# Patient Record
Sex: MALE | Race: WHITE | Employment: FULL TIME | URBAN - METROPOLITAN AREA
[De-identification: names, ages, dates, MRNs, and addresses within clinical notes are randomized per-mention and may not be internally consistent; named-entity substitution may affect disease eponyms.]

---

## 2017-12-26 ENCOUNTER — ANESTHESIA (OUTPATIENT)
Dept: SURGERY | Facility: CLINIC | Age: 62
End: 2017-12-26
Payer: COMMERCIAL

## 2017-12-26 ENCOUNTER — HOSPITAL ENCOUNTER (INPATIENT)
Facility: CLINIC | Age: 62
LOS: 1 days | Discharge: HOME | End: 2017-12-27
Attending: UROLOGY | Admitting: UROLOGY
Payer: COMMERCIAL

## 2017-12-26 ENCOUNTER — ANESTHESIA EVENT (OUTPATIENT)
Dept: SURGERY | Facility: CLINIC | Age: 62
End: 2017-12-26
Payer: COMMERCIAL

## 2017-12-26 DIAGNOSIS — C61 CA OF PROSTATE (H): Primary | ICD-10-CM

## 2017-12-26 LAB — POTASSIUM SERPL-SCNC: 3.7 MMOL/L (ref 3.4–5.3)

## 2017-12-26 PROCEDURE — 25000128 H RX IP 250 OP 636: Performed by: ANESTHESIOLOGY

## 2017-12-26 PROCEDURE — 25000125 ZZHC RX 250: Performed by: UROLOGY

## 2017-12-26 PROCEDURE — 37000008 ZZH ANESTHESIA TECHNICAL FEE, 1ST 30 MIN: Performed by: UROLOGY

## 2017-12-26 PROCEDURE — 25000128 H RX IP 250 OP 636: Performed by: UROLOGY

## 2017-12-26 PROCEDURE — 0VT34ZZ RESECTION OF BILATERAL SEMINAL VESICLES, PERCUTANEOUS ENDOSCOPIC APPROACH: ICD-10-PCS | Performed by: UROLOGY

## 2017-12-26 PROCEDURE — 88305 TISSUE EXAM BY PATHOLOGIST: CPT | Performed by: UROLOGY

## 2017-12-26 PROCEDURE — 25000128 H RX IP 250 OP 636: Performed by: NURSE ANESTHETIST, CERTIFIED REGISTERED

## 2017-12-26 PROCEDURE — 0VT04ZZ RESECTION OF PROSTATE, PERCUTANEOUS ENDOSCOPIC APPROACH: ICD-10-PCS | Performed by: UROLOGY

## 2017-12-26 PROCEDURE — 27210995 ZZH RX 272: Performed by: UROLOGY

## 2017-12-26 PROCEDURE — 88309 TISSUE EXAM BY PATHOLOGIST: CPT | Performed by: UROLOGY

## 2017-12-26 PROCEDURE — 8E0W4CZ ROBOTIC ASSISTED PROCEDURE OF TRUNK REGION, PERCUTANEOUS ENDOSCOPIC APPROACH: ICD-10-PCS | Performed by: UROLOGY

## 2017-12-26 PROCEDURE — 36000085 ZZH SURGERY LEVEL 8 1ST 30 MIN: Performed by: UROLOGY

## 2017-12-26 PROCEDURE — 0VBQ4ZZ EXCISION OF BILATERAL VAS DEFERENS, PERCUTANEOUS ENDOSCOPIC APPROACH: ICD-10-PCS | Performed by: UROLOGY

## 2017-12-26 PROCEDURE — 40000169 ZZH STATISTIC PRE-PROCEDURE ASSESSMENT I: Performed by: UROLOGY

## 2017-12-26 PROCEDURE — 36415 COLL VENOUS BLD VENIPUNCTURE: CPT | Performed by: ANESTHESIOLOGY

## 2017-12-26 PROCEDURE — 88309 TISSUE EXAM BY PATHOLOGIST: CPT | Mod: 26 | Performed by: UROLOGY

## 2017-12-26 PROCEDURE — 25800025 ZZH RX 258: Performed by: UROLOGY

## 2017-12-26 PROCEDURE — 71000012 ZZH RECOVERY PHASE 1 LEVEL 1 FIRST HR: Performed by: UROLOGY

## 2017-12-26 PROCEDURE — 25000132 ZZH RX MED GY IP 250 OP 250 PS 637: Performed by: UROLOGY

## 2017-12-26 PROCEDURE — 36000087 ZZH SURGERY LEVEL 8 EA 15 ADDTL MIN: Performed by: UROLOGY

## 2017-12-26 PROCEDURE — 84132 ASSAY OF SERUM POTASSIUM: CPT | Performed by: ANESTHESIOLOGY

## 2017-12-26 PROCEDURE — 07BC4ZZ EXCISION OF PELVIS LYMPHATIC, PERCUTANEOUS ENDOSCOPIC APPROACH: ICD-10-PCS | Performed by: UROLOGY

## 2017-12-26 PROCEDURE — 25000125 ZZHC RX 250: Performed by: NURSE ANESTHETIST, CERTIFIED REGISTERED

## 2017-12-26 PROCEDURE — 37000009 ZZH ANESTHESIA TECHNICAL FEE, EACH ADDTL 15 MIN: Performed by: UROLOGY

## 2017-12-26 PROCEDURE — 88305 TISSUE EXAM BY PATHOLOGIST: CPT | Mod: 26 | Performed by: UROLOGY

## 2017-12-26 PROCEDURE — 12000000 ZZH R&B MED SURG/OB

## 2017-12-26 PROCEDURE — 25000125 ZZHC RX 250: Performed by: ANESTHESIOLOGY

## 2017-12-26 PROCEDURE — 25000566 ZZH SEVOFLURANE, EA 15 MIN: Performed by: UROLOGY

## 2017-12-26 PROCEDURE — 27210794 ZZH OR GENERAL SUPPLY STERILE: Performed by: UROLOGY

## 2017-12-26 RX ORDER — NEOMYCIN/BACITRACIN/POLYMYXINB 3.5-400-5K
OINTMENT (GRAM) TOPICAL 4 TIMES DAILY PRN
Status: DISCONTINUED | OUTPATIENT
Start: 2017-12-26 | End: 2017-12-27 | Stop reason: HOSPADM

## 2017-12-26 RX ORDER — CIPROFLOXACIN 2 MG/ML
400 INJECTION, SOLUTION INTRAVENOUS EVERY 12 HOURS
Status: DISCONTINUED | OUTPATIENT
Start: 2017-12-26 | End: 2017-12-27

## 2017-12-26 RX ORDER — NEOSTIGMINE METHYLSULFATE 1 MG/ML
VIAL (ML) INJECTION PRN
Status: DISCONTINUED | OUTPATIENT
Start: 2017-12-26 | End: 2017-12-26

## 2017-12-26 RX ORDER — CLINDAMYCIN PHOSPHATE 900 MG/50ML
900 INJECTION, SOLUTION INTRAVENOUS
Status: COMPLETED | OUTPATIENT
Start: 2017-12-26 | End: 2017-12-26

## 2017-12-26 RX ORDER — ONDANSETRON 4 MG/1
4 TABLET, ORALLY DISINTEGRATING ORAL EVERY 30 MIN PRN
Status: DISCONTINUED | OUTPATIENT
Start: 2017-12-26 | End: 2017-12-26 | Stop reason: HOSPADM

## 2017-12-26 RX ORDER — FENTANYL CITRATE 50 UG/ML
INJECTION, SOLUTION INTRAMUSCULAR; INTRAVENOUS PRN
Status: DISCONTINUED | OUTPATIENT
Start: 2017-12-26 | End: 2017-12-26

## 2017-12-26 RX ORDER — ONDANSETRON 2 MG/ML
INJECTION INTRAMUSCULAR; INTRAVENOUS PRN
Status: DISCONTINUED | OUTPATIENT
Start: 2017-12-26 | End: 2017-12-26

## 2017-12-26 RX ORDER — VECURONIUM BROMIDE 1 MG/ML
INJECTION, POWDER, LYOPHILIZED, FOR SOLUTION INTRAVENOUS PRN
Status: DISCONTINUED | OUTPATIENT
Start: 2017-12-26 | End: 2017-12-26

## 2017-12-26 RX ORDER — METHYLPREDNISOLONE 4 MG
8 TABLET, DOSE PACK ORAL DAILY PRN
COMMUNITY

## 2017-12-26 RX ORDER — SODIUM CHLORIDE, SODIUM LACTATE, POTASSIUM CHLORIDE, CALCIUM CHLORIDE 600; 310; 30; 20 MG/100ML; MG/100ML; MG/100ML; MG/100ML
INJECTION, SOLUTION INTRAVENOUS CONTINUOUS
Status: DISCONTINUED | OUTPATIENT
Start: 2017-12-26 | End: 2017-12-26 | Stop reason: HOSPADM

## 2017-12-26 RX ORDER — ONDANSETRON 2 MG/ML
4 INJECTION INTRAMUSCULAR; INTRAVENOUS EVERY 30 MIN PRN
Status: DISCONTINUED | OUTPATIENT
Start: 2017-12-26 | End: 2017-12-26 | Stop reason: HOSPADM

## 2017-12-26 RX ORDER — TRIAMTERENE/HYDROCHLOROTHIAZID 37.5-25 MG
1 TABLET ORAL EVERY MORNING
Status: DISCONTINUED | OUTPATIENT
Start: 2017-12-27 | End: 2017-12-27 | Stop reason: HOSPADM

## 2017-12-26 RX ORDER — BUPIVACAINE HYDROCHLORIDE 2.5 MG/ML
INJECTION, SOLUTION INFILTRATION; PERINEURAL PRN
Status: DISCONTINUED | OUTPATIENT
Start: 2017-12-26 | End: 2017-12-26 | Stop reason: HOSPADM

## 2017-12-26 RX ORDER — AMLODIPINE BESYLATE 10 MG/1
10 TABLET ORAL DAILY
Status: DISCONTINUED | OUTPATIENT
Start: 2017-12-26 | End: 2017-12-27 | Stop reason: HOSPADM

## 2017-12-26 RX ORDER — OXYCODONE AND ACETAMINOPHEN 5; 325 MG/1; MG/1
1-2 TABLET ORAL EVERY 4 HOURS PRN
Status: DISCONTINUED | OUTPATIENT
Start: 2017-12-26 | End: 2017-12-27 | Stop reason: HOSPADM

## 2017-12-26 RX ORDER — FENTANYL CITRATE 0.05 MG/ML
25-50 INJECTION, SOLUTION INTRAMUSCULAR; INTRAVENOUS
Status: DISCONTINUED | OUTPATIENT
Start: 2017-12-26 | End: 2017-12-26 | Stop reason: HOSPADM

## 2017-12-26 RX ORDER — PROPOFOL 10 MG/ML
INJECTION, EMULSION INTRAVENOUS PRN
Status: DISCONTINUED | OUTPATIENT
Start: 2017-12-26 | End: 2017-12-26

## 2017-12-26 RX ORDER — MAGNESIUM HYDROXIDE 1200 MG/15ML
LIQUID ORAL PRN
Status: DISCONTINUED | OUTPATIENT
Start: 2017-12-26 | End: 2017-12-26 | Stop reason: HOSPADM

## 2017-12-26 RX ORDER — HYDROMORPHONE HYDROCHLORIDE 1 MG/ML
.3-.5 INJECTION, SOLUTION INTRAMUSCULAR; INTRAVENOUS; SUBCUTANEOUS
Status: DISCONTINUED | OUTPATIENT
Start: 2017-12-26 | End: 2017-12-27 | Stop reason: HOSPADM

## 2017-12-26 RX ORDER — TRIAMTERENE AND HYDROCHLOROTHIAZIDE 37.5; 25 MG/1; MG/1
1 CAPSULE ORAL EVERY MORNING
Status: DISCONTINUED | OUTPATIENT
Start: 2017-12-27 | End: 2017-12-26

## 2017-12-26 RX ORDER — METOPROLOL SUCCINATE 50 MG/1
200 TABLET, EXTENDED RELEASE ORAL DAILY
Status: DISCONTINUED | OUTPATIENT
Start: 2017-12-26 | End: 2017-12-27 | Stop reason: HOSPADM

## 2017-12-26 RX ORDER — LIDOCAINE 40 MG/G
CREAM TOPICAL
Status: DISCONTINUED | OUTPATIENT
Start: 2017-12-26 | End: 2017-12-27 | Stop reason: HOSPADM

## 2017-12-26 RX ORDER — HYDROMORPHONE HYDROCHLORIDE 1 MG/ML
.3-.5 INJECTION, SOLUTION INTRAMUSCULAR; INTRAVENOUS; SUBCUTANEOUS EVERY 5 MIN PRN
Status: DISCONTINUED | OUTPATIENT
Start: 2017-12-26 | End: 2017-12-26 | Stop reason: HOSPADM

## 2017-12-26 RX ORDER — ACETAMINOPHEN 10 MG/ML
1000 INJECTION, SOLUTION INTRAVENOUS
Status: COMPLETED | OUTPATIENT
Start: 2017-12-26 | End: 2017-12-26

## 2017-12-26 RX ORDER — DEXAMETHASONE SODIUM PHOSPHATE 4 MG/ML
INJECTION, SOLUTION INTRA-ARTICULAR; INTRALESIONAL; INTRAMUSCULAR; INTRAVENOUS; SOFT TISSUE PRN
Status: DISCONTINUED | OUTPATIENT
Start: 2017-12-26 | End: 2017-12-26

## 2017-12-26 RX ORDER — ALLOPURINOL 300 MG/1
300 TABLET ORAL DAILY
Status: DISCONTINUED | OUTPATIENT
Start: 2017-12-26 | End: 2017-12-27 | Stop reason: HOSPADM

## 2017-12-26 RX ORDER — NALOXONE HYDROCHLORIDE 0.4 MG/ML
.1-.4 INJECTION, SOLUTION INTRAMUSCULAR; INTRAVENOUS; SUBCUTANEOUS
Status: DISCONTINUED | OUTPATIENT
Start: 2017-12-26 | End: 2017-12-27 | Stop reason: HOSPADM

## 2017-12-26 RX ORDER — LIDOCAINE HYDROCHLORIDE 20 MG/ML
INJECTION, SOLUTION INFILTRATION; PERINEURAL PRN
Status: DISCONTINUED | OUTPATIENT
Start: 2017-12-26 | End: 2017-12-26

## 2017-12-26 RX ORDER — LIDOCAINE 50 MG/G
OINTMENT TOPICAL 4 TIMES DAILY PRN
Status: DISCONTINUED | OUTPATIENT
Start: 2017-12-26 | End: 2017-12-27 | Stop reason: HOSPADM

## 2017-12-26 RX ORDER — NALOXONE HYDROCHLORIDE 0.4 MG/ML
.1-.4 INJECTION, SOLUTION INTRAMUSCULAR; INTRAVENOUS; SUBCUTANEOUS
Status: DISCONTINUED | OUTPATIENT
Start: 2017-12-26 | End: 2017-12-26

## 2017-12-26 RX ORDER — EPHEDRINE SULFATE 50 MG/ML
INJECTION, SOLUTION INTRAMUSCULAR; INTRAVENOUS; SUBCUTANEOUS PRN
Status: DISCONTINUED | OUTPATIENT
Start: 2017-12-26 | End: 2017-12-26

## 2017-12-26 RX ORDER — GLYCOPYRROLATE 0.2 MG/ML
INJECTION, SOLUTION INTRAMUSCULAR; INTRAVENOUS PRN
Status: DISCONTINUED | OUTPATIENT
Start: 2017-12-26 | End: 2017-12-26

## 2017-12-26 RX ADMIN — AMLODIPINE BESYLATE 10 MG: 10 TABLET ORAL at 20:07

## 2017-12-26 RX ADMIN — Medication 5 MG: at 12:04

## 2017-12-26 RX ADMIN — ACETAMINOPHEN 1000 MG: 10 INJECTION, SOLUTION INTRAVENOUS at 14:22

## 2017-12-26 RX ADMIN — VECURONIUM BROMIDE 1 MG: 1 INJECTION, POWDER, LYOPHILIZED, FOR SOLUTION INTRAVENOUS at 13:52

## 2017-12-26 RX ADMIN — Medication 0.3 MG: at 15:26

## 2017-12-26 RX ADMIN — VECURONIUM BROMIDE 1 MG: 1 INJECTION, POWDER, LYOPHILIZED, FOR SOLUTION INTRAVENOUS at 13:35

## 2017-12-26 RX ADMIN — ALLOPURINOL 300 MG: 300 TABLET ORAL at 17:05

## 2017-12-26 RX ADMIN — LIDOCAINE HYDROCHLORIDE 100 MG: 20 INJECTION, SOLUTION INFILTRATION; PERINEURAL at 11:56

## 2017-12-26 RX ADMIN — FENTANYL CITRATE 100 MCG: 50 INJECTION, SOLUTION INTRAMUSCULAR; INTRAVENOUS at 11:56

## 2017-12-26 RX ADMIN — Medication 0.3 MG: at 15:15

## 2017-12-26 RX ADMIN — ROCURONIUM BROMIDE 50 MG: 10 INJECTION INTRAVENOUS at 12:06

## 2017-12-26 RX ADMIN — VECURONIUM BROMIDE 2 MG: 1 INJECTION, POWDER, LYOPHILIZED, FOR SOLUTION INTRAVENOUS at 13:02

## 2017-12-26 RX ADMIN — Medication 0.3 MG: at 15:33

## 2017-12-26 RX ADMIN — PROPOFOL 200 MG: 10 INJECTION, EMULSION INTRAVENOUS at 11:56

## 2017-12-26 RX ADMIN — LIDOCAINE HYDROCHLORIDE 0.3 ML: 10 INJECTION, SOLUTION EPIDURAL; INFILTRATION; INTRACAUDAL; PERINEURAL at 10:25

## 2017-12-26 RX ADMIN — Medication 5 MG: at 12:11

## 2017-12-26 RX ADMIN — DEXTROSE AND SODIUM CHLORIDE: 5; 450 INJECTION, SOLUTION INTRAVENOUS at 17:06

## 2017-12-26 RX ADMIN — PHENYLEPHRINE HYDROCHLORIDE 100 MCG: 10 INJECTION INTRAVENOUS at 12:14

## 2017-12-26 RX ADMIN — HYDROMORPHONE HYDROCHLORIDE 0.2 MG: 1 INJECTION, SOLUTION INTRAMUSCULAR; INTRAVENOUS; SUBCUTANEOUS at 13:12

## 2017-12-26 RX ADMIN — METOPROLOL SUCCINATE 200 MG: 50 TABLET, EXTENDED RELEASE ORAL at 20:06

## 2017-12-26 RX ADMIN — NEOSTIGMINE METHYLSULFATE 5 MG: 1 INJECTION INTRAMUSCULAR; INTRAVENOUS; SUBCUTANEOUS at 14:29

## 2017-12-26 RX ADMIN — Medication 5 MG: at 13:23

## 2017-12-26 RX ADMIN — Medication 0.3 MG: at 15:09

## 2017-12-26 RX ADMIN — SUCCINYLCHOLINE CHLORIDE 100 MG: 20 INJECTION, SOLUTION INTRAMUSCULAR; INTRAVENOUS at 11:56

## 2017-12-26 RX ADMIN — HYDROMORPHONE HYDROCHLORIDE 0.3 MG: 1 INJECTION, SOLUTION INTRAMUSCULAR; INTRAVENOUS; SUBCUTANEOUS at 12:30

## 2017-12-26 RX ADMIN — CIPROFLOXACIN 400 MG: 2 INJECTION, SOLUTION INTRAVENOUS at 20:06

## 2017-12-26 RX ADMIN — CLINDAMYCIN PHOSPHATE 900 MG: 18 INJECTION, SOLUTION INTRAVENOUS at 12:00

## 2017-12-26 RX ADMIN — ONDANSETRON 4 MG: 2 INJECTION INTRAMUSCULAR; INTRAVENOUS at 14:20

## 2017-12-26 RX ADMIN — DEXAMETHASONE SODIUM PHOSPHATE 4 MG: 4 INJECTION, SOLUTION INTRA-ARTICULAR; INTRALESIONAL; INTRAMUSCULAR; INTRAVENOUS; SOFT TISSUE at 12:12

## 2017-12-26 RX ADMIN — GLYCOPYRROLATE 0.8 MG: 0.2 INJECTION, SOLUTION INTRAMUSCULAR; INTRAVENOUS at 14:29

## 2017-12-26 RX ADMIN — Medication 5 MG: at 13:20

## 2017-12-26 RX ADMIN — SODIUM CHLORIDE, POTASSIUM CHLORIDE, SODIUM LACTATE AND CALCIUM CHLORIDE: 600; 310; 30; 20 INJECTION, SOLUTION INTRAVENOUS at 10:24

## 2017-12-26 RX ADMIN — SODIUM CHLORIDE, POTASSIUM CHLORIDE, SODIUM LACTATE AND CALCIUM CHLORIDE: 600; 310; 30; 20 INJECTION, SOLUTION INTRAVENOUS at 14:01

## 2017-12-26 RX ADMIN — MIDAZOLAM 2 MG: 1 INJECTION INTRAMUSCULAR; INTRAVENOUS at 11:49

## 2017-12-26 RX ADMIN — Medication 5 MG: at 12:13

## 2017-12-26 ASSESSMENT — COPD QUESTIONNAIRES: COPD: 0

## 2017-12-26 NOTE — ANESTHESIA PREPROCEDURE EVALUATION
Procedure: Procedure(s):  DAVINCI XI PROSTATECTOMY  Preop diagnosis: PROSTATE CANCER    Allergies   Allergen Reactions     Lisinopril Cough     Rocephin [Ceftriaxone] Rash     Past Medical History:   Diagnosis Date     Essential hypertension, benign      Gilbert disease      Gout 2010     Gout      Nephrolithiasis      Obesity      Prostate cancer (H)      Renal cancer (H)      Renal cell cancer (H) 2010     Past Surgical History:   Procedure Laterality Date     C NEPHRECTOMY  2010    Left      COLONOSCOPY  2007     CYSTOSCOPY, RETROGRADES, EXTRACT STONE, INSERT STENT, COMBINED Right 12/16/2014    Procedure: COMBINED CYSTOSCOPY, RETROGRADES, EXTRACT STONE, INSERT STENT;  Surgeon: Bridger Brewer MD;  Location:  OR     CYSTOSCOPY, URETEROSCOPY, COMBINED  12/16/2014    Procedure: COMBINED CYSTOSCOPY, URETEROSCOPY;  Surgeon: Bridger Brewer MD;  Location:  OR     NEPHRECTOMY       TONSILLECTOMY, ADENOIDECTOMY, COMBINED       Social History   Substance Use Topics     Smoking status: Never Smoker     Smokeless tobacco: Never Used     Alcohol use No     Prior to Admission medications    Medication Sig Start Date End Date Taking? Authorizing Provider   ALLOPURINOL PO Take 300 mg by mouth daily   Yes Reported, Patient   methylPREDNISolone (MEDROL DOSEPAK) 4 MG tablet Take 8 mg by mouth daily as needed (gout flares) follow package directions   Yes Reported, Patient   Acetaminophen (TYLENOL PO) Take 1,000 mg by mouth 2 times daily as needed for mild pain or fever   Yes Reported, Patient   metoprolol (TOPROL-XL) 200 MG 24 hr tablet Take 1 tablet by mouth daily. 6/9/11  Yes Jose Antonio Koroma MD   amlodipine (NORVASC) 10 MG tablet Take 1 tablet by mouth daily. 6/9/11  Yes Jose Antonio Koroma MD   triamterene-hydrochlorothiazide (DYAZIDE) 37.5-25 MG per capsule Take 1 capsule by mouth every morning. 6/9/11  Yes Jose Antonio Koroma MD     No current Epic-ordered facility-administered medications on file.      No current  Epic-ordered outpatient prescriptions on file.       Wt Readings from Last 1 Encounters:   12/16/14 102.5 kg (226 lb)     Temp Readings from Last 1 Encounters:   12/16/14 36.7  C (98.1  F)     BP Readings from Last 6 Encounters:   12/16/14 117/70   06/09/11 138/88   06/15/10 138/73   05/19/10 119/71   04/15/10 122/93   03/15/10 119/77     Pulse Readings from Last 4 Encounters:   06/09/11 56   06/15/10 57   05/19/10 52   04/15/10 59     Resp Readings from Last 1 Encounters:   12/16/14 11     SpO2 Readings from Last 1 Encounters:   12/16/14 93%     Recent Labs   Lab Test  06/09/11   0912  06/15/10   1132   NA  138  140   POTASSIUM  4.1  4.2   CHLORIDE  104  100   CO2  22  28   ANIONGAP  12  12   GLC  91  95   BUN  20  17   CR  1.08  1.11   TURNER  9.8  9.8     Recent Labs   Lab Test  06/15/10   1132  03/18/10   1403  03/15/10   1507   AST  30   --   41   ALT  41   --   67   ALKPHOS  103   --   91   BILITOTAL  2.7*  2.0*  1.5*     Recent Labs   Lab Test  06/15/10   1132  03/15/10   1507   WBC  10.7  8.4   HGB  16.0  17.3   PLT  255  293         Anesthesia Evaluation     .             ROS/MED HX    ENT/Pulmonary:      (-) asthma, COPD and sleep apnea   Neurologic:       Cardiovascular:     (+) hypertension----. : . . . :. .       METS/Exercise Tolerance:     Hematologic:         Musculoskeletal:         GI/Hepatic:     (+) liver disease (gilberts),       Renal/Genitourinary:     (+) chronic renal disease (renal ca s/p nephrectomy),       Endo:     (+) Obesity, .      Psychiatric:         Infectious Disease:         Malignancy:   (+) Malignancy History of Prostate and Other  Other CA renal status post         Other:                     Physical Exam      Airway   Mallampati: IV  TM distance: >3 FB  Neck ROM: full    Dental   (+) caps    Cardiovascular   Rhythm and rate: regular      Pulmonary    breath sounds clear to auscultation                    Anesthesia Plan      History & Physical Review  History and physical  reviewed and following examination; no interval change.    ASA Status:  2 .    NPO Status:  > 8 hours    Plan for General and ETT   PONV prophylaxis:  Ondansetron (or other 5HT-3) and Dexamethasone or Solumedrol  Additional equipment: Videolaryngoscope      Postoperative Care      Consents  Anesthetic plan, risks, benefits and alternatives discussed with:  Patient..                          .

## 2017-12-26 NOTE — PROCEDURES
Post-operative Note    Preoperative Diagnosis:  Prostate Cancer  Postoperative Diagnosis:  Prostate Cancer    Procedure:  Robot Assisted Radical Prostatectomy with pelvic lymphadenectomy        Surgeon(s):  Bridger Brewer MD  Assistant Surgeon(s): Dez Hanks  Date of Procedure: 12/26/2017    PSA: 6.4  PROSTATE VOLUME:45 cc  PATHOLOGY:  Adenocarcinoma, Caron grade 7  Right [place]: 3/3  Right Mid: 3/4  Right Base:   Left [place]: 3/3  Left Mid:   Left Base:   NEOADJUVANT TREATMENT:   Hormones:  no      Avodart:   no        Proscar: no        POTENCY SPARING:  Subhash  RISK FACTORS:   There is no height or weight on file to calculate BMI.   Hernia:    no   Appendectomy: no   Adhesions: no              Surgeries: no    PORT PLACEMENT:  Standard 6 ports     After discussing all treatment options, the patient elected to proceed with robotic prostatectomy.  The patient understands that we will proceed with robotic prostatectomy, but will convert to open prostatectomy if needed.    DESCRIPTION OF PROCEDURE:    The patient was identified and was brougt to the operating room.  Hewas placed on a Gelfoam padded table.  After adequate general anesthesia, he was placed in low stirrups.  Pneumatic compression stockings had been applied.  All the pressure points were adequately padded.  Shoulder braces were used, these were appropriately positioned not to cause any pressure.  The patient was then prepped and draped in the usual manner.  Time out was called.  An 18 [place] gant catheter was placed with in the sterile field and the bladder was emptied.  I made a small supra umbilical incision.  Stay sutures were placed on the fascia.  Using a Veress needle, pneumoperitoneum was achieved.  I then placed a 12 mm port at this site.  Initial endoscopic inspection with a 0 degree lens showed findings as noted above.  The remaining ports were then placed.  Two 8 mm ports were placed on either side 10 cm from the umbilical port.  A 12 mm port  was placed in the right lower quadrant above the anterior superior iliac spine, and a similar location on the left side an 8 mm port was placed.  A 8 mm airseal/suction port was placed lateral to the camera port on the right side.  With all the ports in place the patient was placed in steep trendelenburg position and the robot was docked.    I performed the robotic prostatectomy in my standard fashion.  4 arms were used.  The surgery was performed using a 0 degree lens.  Guarded scissors in arm 1, PK coagulator in arm 2 and prograsp in arm 3.  I first incised the peritonium at the bladder base.  The seminal vesicles and vas deferens were identified and dissected free and the plane between the prostate and rectum was identified and seperated as distal and lateral as possible.  Next peritoneal incisions were made lateral to the medial umbilical ligaments and the bladder was dissected away from the anterior abdominal wall and pubic ramus to expose the prostate.  The superficial dorsal vein was cauterized and transected.  The endopelvic fascia was opened.  I then proceeded to dissect the tissue away from the apex of the prostate down to the deep dorsal vein complex.  I then placed a 0 Vicryl suture on a CT-1 needle to ligate the deep dorsal complex.      Next the anterior bladder wall was incised at the level of the bladder neck.  The gant catheter was extracted from the bladder and placed to traction using the third arm.  The posterior bladder neck was then excised and dissected away from the base of the prostate.  The previously dissected seminal vesicles and vas deferens were now brought into view.  These structures were then tented up.  The plane between the prostate and the rectum was further dissected up to the apex of the prostate.  This exposed the lateral pedicles.      I then proceeded to identify the plane between the lateral prostate and the lateral pedicles. Hem-O-Александр clips/ Enseal/ Vessel sealer was/ were  used and the lateral pedicle was/were transected.  Electro Cautery was not used in this area.  Nerve preservation was performed.  The dissection was carried around the apex of the prostate.  Next  I transected the deep dorsal vein and further dissection was carried around the apex of the prostate.  The urethra was transected distal to the apex of the prostate and the prostate was placed in am Endopouch.  Ravi-Seal was used to achieve further hemostasis.    I then performed pelvic lymphadenectomy. The lymph nodes along the right obturator and iliac chain were removed. They were no enlarged.      Next, I proceeded to anastomosis the bladder neck to the urethra using a running suture of 3-0 Monocryl on a double armed needle.  A two layer anastomosis was performed posteriorly.  A 20 Palestinian gant catheter was placed and the bladder irrigated well.  A good watertight , tension free anastasmosis was obtained.  Further inspection at this time showed no further bleeding.  The string of the Endopouch was then brought out through the umbilical port site.   The robot was then docked away.  A 15 Palestinian round Omer Saleh was placed through the left lower quadrant port and all the laparoscopic ports were removed.  I slightly extended the supraumbilical incision and the prostate was removed through this site.  The fascia was then closed using Vicryl sutures.  The skin incisions were closed using staples.  The Omer Saleh was secured to the skin with a silk stitch.  The needle, sponge and lap counts were correct.  The patient remained stable throughout the entire procedure.  The estimated blood loss was  . The patient tolerated the procedure well and was sent to the recovery room in stable condition.

## 2017-12-26 NOTE — IP AVS SNAPSHOT
MRN:[id number]                      After Visit Summary   12/26/2017    Enoch Cruz    MRN: [id number]           Thank you!     Thank you for choosing [facility] for your care. Our goal is always to provide you with excellent care. Hearing back from our patients is one way we can continue to improve our services. Please take a few minutes to complete the written survey that you may receive in the mail after you visit with us. Thank you!        Patient Information     Date Of Birth          1955        Designated Caregiver       Most Recent Value    Caregiver    Will someone help with your care after discharge? yes    Name of designated caregiver tiffanie    Phone number of caregiver [phone]    Caregiver address [address]      About your hospital stay     You were admitted on:  December 26, 2017 You last received care in the:  [facility] [address] [facility]    You were discharged on:  December 27, 2017        Reason for your hospital stay       surgery                  Who to Call     For medical emergencies, please call 911.  For non-urgent questions about your medical care, please call your primary care provider or clinic, [phone]  For questions related to your surgery, please call your surgery clinic        Attending Provider     Provider Specialty    Bridger Brewer MD Urology       Primary Care Provider Office Phone # Fax #    Jose Antonio Phil Koroma [phone]      After Care Instructions     Diet       Reg diet                  Follow-up Appointments     Follow-up and recommended labs and tests        Follow up with Bridger pool. Osman, Jan 4 th                  Pending Results     Date and Time Order Name Status Description    12/26/2017 1422 Surgical pathology exam In process             Admission Information     Date & Time Provider Department Dept. Phone    12/26/2017 Bridger Brewer MD [facility] [address] [facility] [phone]      Your  Vitals Were     Blood Pressure Temperature Respirations Height Weight Pulse Oximetry    123/74 (BP Location: Right arm) 98.2  F (36.8  C) (Oral) 16 1.829 m (6') 107 kg (236 lb) 95%    BMI (Body Mass Index)                   32.01 kg/m2           [facility] Information     [employer] gives you secure access to your electronic health record. If you see a primary care provider, you can also send messages to your care team and make appointments. If you have questions, please call your primary care clinic.  If you do not have a primary care provider, please call [phone] and they will assist you.        Care EveryWhere ID     This is your Care EveryWhere ID. This could be used by other organizations to access your [facility] medical records  [id number]        Equal Access to Services     FEDERICO BAKER : Blanquita Solorio, adithya guzman, tehresa roper, pablo gomes. So [facility] [phone].    ATENCIÓN: Si habla español, tiene a estrada disposición servicios gratuitos de asistencia lingüística. Llame al [phone].    We comply with applicable federal civil rights laws and [place] laws. We do not discriminate on the basis of race, color, national origin, age, disability, sex, sexual orientation, or gender identity.               Review of your medicines      START taking        Dose / Directions    oxyCODONE-acetaminophen 5-325 MG per tablet   Commonly known as:  PERCOCET        Dose:  1 tablet   Take 1 tablet by mouth every 6 hours as needed for moderate to severe pain   Quantity:  30 tablet   Refills:  0         CONTINUE these medicines which have NOT CHANGED        Dose / Directions    ALLOPURINOL PO        Dose:  300 mg   Take 300 mg by mouth daily   Refills:  0       amLODIPine 10 MG tablet   Commonly known as:  NORVASC   Used for:  Unspecified essential hypertension        Dose:  10 mg   Take 1 tablet by mouth daily.   Quantity:  90 tablet   Refills:  3        methylPREDNISolone 4 MG tablet   Commonly known as:  MEDROL DOSEPAK        Dose:  8 mg   Take 8 mg by mouth daily as needed (gout flares) follow package directions   Refills:  0       metoprolol 200 MG 24 hr tablet   Commonly known as:  TOPROL-XL   Used for:  Unspecified essential hypertension        Dose:  1 tablet   Take 1 tablet by mouth daily.   Quantity:  90 tablet   Refills:  3       triamterene-hydrochlorothiazide 37.5-25 MG per capsule   Commonly known as:  DYAZIDE   Used for:  Unspecified essential hypertension        Dose:  1 capsule   Take 1 capsule by mouth every morning.   Quantity:  90 capsule   Refills:  3       TYLENOL PO        Dose:  1000 mg   Take 1,000 mg by mouth 2 times daily as needed for mild pain or fever   Refills:  0            Where to get your medicines      Some of these will need a paper prescription and others can be bought over the counter. Ask your nurse if you have questions.     Bring a paper prescription for each of these medications     oxyCODONE-acetaminophen 5-325 MG per tablet                Protect others around you: Learn how to safely use, store and throw away your medicines at www.disposemymeds.org.             Medication List: This is a list of all your medications and when to take them. Check marks below indicate your daily home schedule. Keep this list as a reference.      Medications           Morning Afternoon Evening Bedtime As Needed    ALLOPURINOL PO   Take 300 mg by mouth daily   Last time this was given:  300 mg on 12/27/2017  8:00 AM                                   amLODIPine 10 MG tablet   Commonly known as:  NORVASC   Take 1 tablet by mouth daily.   Last time this was given:  10 mg on 12/26/2017  8:07 PM                                   methylPREDNISolone 4 MG tablet   Commonly known as:  MEDROL DOSEPAK   Take 8 mg by mouth daily as needed (gout flares) follow package directions                                   metoprolol 200 MG 24 hr tablet   Commonly  known as:  TOPROL-XL   Take 1 tablet by mouth daily.   Last time this was given:  200 mg on 12/26/2017  8:06 PM                                   oxyCODONE-acetaminophen 5-325 MG per tablet   Commonly known as:  PERCOCET   Take 1 tablet by mouth every 6 hours as needed for moderate to severe pain                                   triamterene-hydrochlorothiazide 37.5-25 MG per capsule   Commonly known as:  DYAZIDE   Take 1 capsule by mouth every morning.                                   TYLENOL PO   Take 1,000 mg by mouth 2 times daily as needed for mild pain or fever                                             More Information        Catheter-Associated Urinary Tract Infections     A small balloon keeps the catheter in place inside the bladder.   A catheter-associated urinary tract infection (CAUTI) is an infection of the urinary system. CAUTI is caused by bacteria that enter the urinary tract when a urinary catheter is used. This is a tube that s placed into the bladder to drain urine.  The urinary system  This system includes the kidneys, ureters, bladder, and urethra. The kidneys filter blood and make urine. The ureters carry urine from the kidneys to the bladder. The bladder stores urine. The urethra carries urine from the bladder to the outside of the body.  What is a urinary catheter?  A urinary catheter is a thin, flexible tube. It is placed in the bladder to drain urine. Urine flows through the tube into a collecting bag outside of the body. There are different types of urinary catheters. The most common type is an indwelling catheter. This is also known as a urethral catheter. This is because it s placed into the bladder through the urethra. This catheter is also called a Thompson catheter.  Why is a urinary catheter needed?  A urinary catheter is needed for any of the following:    You can t get up to use the toilet because your mobility is limited. This may be due to surgery, an injury, or illness.    You  have a blockage in your urinary system.    Your healthcare provider needs to measure the amount of urine you pass.    The function of your kidneys and bladder is being tested.    You re not able to control your bladder (incontinence).  In most cases, the urinary catheter is temporary. You'll need it only until the problem that requires it is resolved.   How does a CAUTI develop?  Bacteria can enter the urinary tract as the catheter is put into the urethra. Bacteria can also get into the urinary tract while the catheter is in place. The common bacteria that cause a CAUTI are ones that live in the intestine. These bacteria don t normally cause problems in the intestine. But when they get into the urinary tract, a CAUTI can result.  Why is a CAUTI of concern?  Left untreated, a CAUTI can lead to health problems. These problems may include bladder infection, prostate infection, and kidney infection. A CAUTI can prolong your hospital stay. If the infection is not treated in time, serious health complications may occur.  What are the symptoms of a CAUTI?    A burning feeling, pressure, or pain in your lower abdomen    Fever or chills    Urine in the collecting bag is cloudy or bloody (pink or red)    Burning feeling in the urethra or genital area    Aching in the back (kidney area)    Nausea and vomiting    Person is confused, or is not alert, or has a change in behavior (mainly affects older patients)    Note that sometimes a person won t have any symptoms but may still have a CAUTI.  Tell a healthcare provider right away if you or your loved one has any of these symptoms.  How is CAUTI diagnosed?  If you have symptoms of CAUTI your healthcare provider will order tests. These include a urine test, blood tests, and other tests as needed.  How is CAUTI treated?   Treatment may involve any of the following:    Antibiotics. Your healthcare provider will likely prescribe antibiotics if you have symptoms. Be aware that if you  don t have symptoms, you may not be given antibiotics. This is to prevent an increase in bacteria that resist (can t be killed by) certain antibiotics.    Removing the catheter. The catheter will be removed when your healthcare provider decides it s no longer needed. This usually helps stop the infection.    Changing the catheter. If you still need a catheter, the old one will be removed. A new one will be put in. This may help stop the infection.  How do hospital and long-term facility staff prevent CAUTI?  To keep patients from getting a CAUTI, the staff follow certain procedures:    Prescribe a catheter only when it s needed. It is removed as soon as it s no longer needed.    Use sterile (clean) technique when placing the catheter into the urinary tract. This means before putting the catheter in, the caregiver washes his or her hands with soap and water. He or she then puts on sterile gloves. A sterile catheter kit that has cleansers is used to cleanse the patient s genital area.    Before performing catheter care, caregivers also wash their hands or use an alcohol-based hand cleanser.    Hang the bag lower than your bladder. This prevents urine from flowing back into your bladder.    Ensure that the bag is emptied regularly.  What you can do as a patient to prevent CAUTI  You can help prevent yourself from getting a CAUTI by doing the following:    Every day ask your healthcare provider how long you need to have the catheter. The longer you have a catheter, the higher your chance of getting a CAUTI.    If a caregiver doesn t clean his or her hands and put on gloves before touching your catheter, ask them to do so.    If you ve been taught how to care for your catheter, be sure to wash your hands before and after each session.    Make sure your bag is lower than your bladder. If it s not, tell your caregiver.    Don t disconnect the catheter and drain tube. Doing so allows germs to get into the  catheter.    Cleansing of the genital and perineal areas is very important to help decrease bacteria in areas surrounding the catheter. Ask your doctor what you should use and how often to clean these areas.  If you are discharged with an indwelling catheter    Before you leave the hospital, make sure you understand the instructions on how to care for your catheter at home.    Ask your healthcare provider how long you need the catheter. Also ask if you need to make a follow-up appointment to have the catheter removed.    Always use sterile (clean) technique when caring for your catheter. Wash your hands before and after doing any catheter care.    Call your healthcare provider right away if you develop symptoms of a CAUTI (see above).     Date Last Reviewed: 1/1/2017 2000-2017 [employer]. [address] [place] [address], [place] [address]. All rights reserved. This information is not intended as a substitute for professional medical care. Always follow your healthcare professional's instructions.                Thompson Catheter Care    A Thompson catheter is a rubber tube that is placed through the urethra (opening where urine comes out) and into the bladder. This helps drain urine from the bladder. There is a small balloon on the end of the tube that is inflated after insertion. This keeps the catheter from sliding out of the bladder.  A Thompson catheter is used to treat urinary retention (unable to pass urine). It is also used when there is incontinence (loss of bladder control).  Home care    Finish taking any prescribed antibiotic even if you are feeling better before then.    It is important to keep bacteria from getting into the collection bag. Do not disconnect the catheter from the collection bag.    Use a leg band to secure the drainage tube, so it does not pull on the catheter. Drain the collection bag when it becomes full using the drain spout at the bottom of the bag.    Do not try to pull or remove  your catheter. This will injure your urethra. It must be removed by your healthcare provider or nurse.  Follow-up care  Follow up with your healthcare provider as advised for repeat urine testing and catheter removal or replacement.  When to seek medical advice  Call your healthcare provider right away if any of these occur:    Fever of 100.4 F (38 C) or higher, or as directed by your healthcare provider    Bladder pain or fullness    Abdominal swelling, nausea or vomiting, or back pain    Blood or urine leakage around the catheter    Bloody urine coming from the catheter (if a new symptom)    Catheter falls out    Catheter stops draining for 6 hours    Weakness, dizziness, or fainting  Date Last Reviewed: 10/1/2016    8405-0546 [employer] [address] [place] [address], [place] [address]. All rights reserved. This information is not intended as a substitute for professional medical care. Always follow your healthcare professional's instructions.                Discharge Instructions: Caring for Your Indwelling Urinary Catheter  You have been discharged with an indwelling urinary catheter (also called a Thompson catheter). A catheter is a thin, flexible tube. An indwelling urinary catheter has two parts. The first part is a tube that drains urine from your bladder. The second part is a bag or other device that collects the urine.  The most important thing to remember is that you want to prevent infection. Always wash your hands before handling your catheter bag or tubing.  Draining the bedside bag    Wash your hands with soap and clean, running water or use an alcohol-based hand  that contains at least 60% alcohol.    Hold the drainage tube over a toilet or measuring container.    Unclamp the tube and let the bag drain.    Don t touch the tip of the drainage tube or let it touch the toilet or container.  Cleaning the drainage tube    When the bag is empty, clean the tip of the drainage tube with an  alcohol wipe.    Clamp the tube.    Reinsert the tube into the pocket on the drainage bag.  Cleaning your skin and tubing    Clean the skin near the catheter with soap and water.    Wash your genital area from front to back.    Wash the catheter tubing. Always wash the catheter in the direction away from your body.    You will be told when and how to change your bag and tubing.    Don t try to remove the catheter by yourself.    You may shower with the catheter in place.  Emptying a leg bag    Wash your hands.    Remove the stopper on the bag.    Drain the bag into the toilet or a measuring container. Don t let the tip of the drainage tube touch anything, including your fingers.    Clean the tip of the drainage tube with alcohol.    Replace the stopper.  Follow-up care  Make a follow-up appointment as directed by your healthcare provider  When to call your healthcare provider  Call your healthcare provider right away if you have any of the following:    Chills or fever above 100.4 F (38 C)    Leakage around the catheter insertion site    Increased spasms (uncontrollable twitching) in your legs, abdomen, or bladder. Occasional mild spasms are normal.    Burning in the urinary tract, penis, or genital area    Nausea and vomiting    Aching in the lower back    Cloudy or bloody (pink or red) urine, sediment or mucus in the urine, or foul-smelling urine   Date Last Reviewed: 1/1/2017 2000-2017 [employer]. [address] [place], [place] [id number]. All rights reserved. This information is not intended as a substitute for professional medical care. Always follow your healthcare professional's instructions.                Discharge Instructions: Caring for Your Leg Bag  You are going home with a urinary catheter and collection device (drainage bag) in place. One type of collection device is called a leg bag. This is a smaller drainage bag that you can wear on your leg to collect urine during the day. The  bag can fit under your clothing. You can move around with greater ease when using a leg bag instead of a larger collection bag.  You were shown how to care for your catheter in the hospital. This sheet will help you remember those steps when you are at home.  Home care    Wash your hands thoroughly before and after you care for your catheter or collection device.    Gather your supplies:    Alcohol wipes    Soap and water    Towel and washcloth    Leg strap and leg bag    Use soap and water to wash the area where your catheter enters your body. Rinse well.    Secure the bag grubbs to your leg:    Position the leg band high on your thigh with the product label pointing away from your leg.    Stretch the leg band in place and fasten.    Place the catheter tubing over the bag and secure it. You may secure it with a Velcro tab or other method, depending on the product you use. Be sure to leave enough loop in the catheter above the leg band to avoid pulling on the tube.    Every 4 to 6 hours, reposition the band to prevent pressure from the elastic on your leg. You can do this by changing the bag to the other leg or by raising or lowering the leg band.    Wash the band as frequently as needed. You can hand wash and dry the leg band.    Place the bag in the bag grubbs.    Clean the urine bag end of the catheter and your catheter port with an alcohol wipe.    Place a towel under the bag and port to keep urine from dripping onto your leg.    Before connecting the outlet valve at the bottom of the bag to the catheter, make sure that it is firmly closed. Flip the valve upward toward the bag; it needs to snap firmly in place. Be sure not to tug on the tubing. Be gentle.    Attach the urine bag to the end of the catheter; insert the connector snugly into the catheter port. You can avoid dribbling urine by bending the catheter tubing just below the tip and holding it while you disconnect it from the catheter. Be careful to keep  the tip clean while connecting the leg bag tubing to the catheter--this keeps germs from getting into the system.    Drain the bag when it is full. To drain the bag, flip the clamp downward. Direct the flexible outlet tube to control the flow of urine. You don t have to disconnect the leg bag from the catheter to empty it. Raise your leg up to the edge of the toilet to reach the leg bag. Then you can empty the bag directly into the toilet. This way, you won t need to bend over, which may be uncomfortable.    Keep the leg bag clean. Rinse daily with equal parts water and vinegar to reduce odor and keep the bag free of germs.    Remember to keep the drainage bag below the level of your bladder for proper drainage.  Follow-up  Make a follow-up appointment as directed by your healthcare provider.  When to call your healthcare provider  Call your healthcare provider right away if you have any of the following:    Redness, swelling, or warmth around the catheter entry site    Pus draining from your catheter entry site or into the catheter tubing and bag    Blood, clots, or floating debris in the urine    Nausea and vomiting    Shaking chills    Fever above 100.4 F (38 C)    Pain that is not relieved by medicine     Catheter that falls out or is dislodged   Date Last Reviewed: 1/1/2017 2000-2017 The [employer]. [address] [place] [address]. All rights reserved. This information is not intended as a substitute for professional medical care. Always follow your healthcare professional's instructions.                Emptying and Cleaning Your Urinary Catheter Bag  You have an indwelling urinary catheter. This drains urine from your bladder into a bag. The bag can be one that is used at the bedside. Or it can be a smaller bag that is strapped to the leg. Follow the numbered steps below to empty and clean a urinary bag.       Drain Clean tube Clean catheter   Step 1. Drain the bag    Wash your hands  well with soap and water to prevent infecting the urinary catheter and bag.    If the short drainage tube is inserted into a pocket on the bag, take the drainage tube out of the pocket.    Hold the drainage tube over a toilet or measuring container. Open the valve.    Don t touch the tip of the valve or let it touch the toilet or container.    Wash your hands again.  Step 2. Clean the drainage tube    When the bag is empty, clean the tip of the drainage valve with an alcohol wipe.    Close the valve.    Reinsert the drainage tube into the pocket, if there is one.  Step 3. Clean your skin    Wash your hands well before and after cleaning your skin.    If you have a catheter (such as a Thompson) that enters through the urethra, clean the urethral area with soap and water 1 time(s) daily as you were taught by your healthcare provider. You should also clean after every bowel movement to prevent infection.    Avoid pulling on the tubing when cleaning so you don t injure the urethra.    Don t apply antibiotic ointment or any other antibacterial product to the urethra.    Don t use lubricant on the urethra.    Don t apply powder to the genital area or to the tubing.    If you have a suprapubic catheter  (one that was surgically placed into the bladder through the lower abdomen), your healthcare provider will tell you how to clean your skin around the catheter.  Step 4. Check and clean the catheter tubing    Check the tubing. If there are kinks, cracks, clogs, or you can t see into the tubing, you ll need to change to new tubing as you were shown by your healthcare provider.    If the current tubing can still be used, wash it with soap and water. Always wash the tubing in the direction away from your body. Avoid pulling on the tubing.    Dry the tubing with a clean washcloth or paper towel.  Step 5. Clean the drainage bag    Clean the drainage bag once every 3 days.    Have a clean backup bag or other drainage device  ready.    Follow these steps:    Wash your hands well with soap and water.    Disconnect the bag from the catheter tubing. Connect the tubing to the backup bag or drainage device.    Drain any remaining urine from the bag you just disconnected. Close the drainage valve.    Pour some warm soapy water into the bag. Swish the soap around, being sure to get the corners of the bag.    Open the drainage valve to drain the soap. Close the valve.    Fill the bag with 2 parts vinegar and 3 parts water. Shake the solution a bit and allow it to remain in the bag for 30 minutes.    Drain the vinegar solution and rinse the bag with cold tap water.    Hang the bag to drain and air-dry.  When to call your healthcare provider  Call your healthcare provider right away if you have any of the following:    Little or no urine flowing into the bag    Urine leaking where the catheter enters the body    Pain, burning, or redness of the area where the catheter enters the body    Bloody urine (a trace of blood is normal)    Cloudy or foul-smelling urine, or sand-like grains in your urine    Pain in your lower back or lower abdomen    Your catheter falls out    Fever of 100.4 F (38 C) or higher, or shaking chills   Date Last Reviewed: 1/1/2017 2000-2017 [employer]. [address] [place] [address], [place] [id number]. All rights reserved. This information is not intended as a substitute for professional medical care. Always follow your healthcare professional's instructions.

## 2017-12-26 NOTE — ANESTHESIA CARE TRANSFER NOTE
Patient: Enoch Cruz    Procedure(s):  DAVINCI XI RADICAL PROSTATECTOMY, POSSIBLE CONVERSION TO OPEN - Wound Class: II-Clean Contaminated    Diagnosis: PROSTATE CANCER  Diagnosis Additional Information: No value filed.    Anesthesia Type:   General, ETT     Note:    Patient transferred to:PACU  Handoff Report: Identifed the Patient, Identified the Reponsible Provider, Reviewed the pertinent medical history, Discussed the surgical course, Reviewed Intra-OP anesthesia mangement and issues during anesthesia, Set expectations for post-procedure period and Allowed opportunity for questions and acknowledgement of understanding      Vitals: (Last set prior to Anesthesia Care Transfer)    CRNA VITALS  12/26/2017 1411 - 12/26/2017 1449      12/26/2017             Pulse: 80    SpO2: 97 %    Resp Rate (set): 10                Electronically Signed By: JOSE R Castro CRNA  December 26, 2017  2:49 PM

## 2017-12-26 NOTE — IP AVS SNAPSHOT
[address]    [facility] [address]    Phone:  [phone]                                       After Visit Summary   12/26/2017    Enoch Cruz    MRN: [id number]           After Visit Summary Signature Page     I have received my discharge instructions, and my questions have been answered. I have discussed any challenges I see with this plan with the nurse or doctor.    ..........................................................................................................................................  Patient/Patient Representative Signature      ..........................................................................................................................................  Patient Representative Print Name and Relationship to Patient    ..................................................               ................................................  Date                                            Time    ..........................................................................................................................................  Reviewed by Signature/Title    ...................................................              ..............................................  Date                                                            Time

## 2017-12-26 NOTE — ANESTHESIA POSTPROCEDURE EVALUATION
Patient: Enoch Cruz    Procedure(s):  DAVINCI XI RADICAL PROSTATECTOMY, POSSIBLE CONVERSION TO OPEN - Wound Class: II-Clean Contaminated    Diagnosis:PROSTATE CANCER  Diagnosis Additional Information: No value filed.    Anesthesia Type:  General, ETT    Note:  Anesthesia Post Evaluation    Patient location during evaluation: PACU  Patient participation: Able to fully participate in evaluation  Level of consciousness: awake, awake and alert and responsive to verbal stimuli  Pain management: adequate  Airway patency: patent  Cardiovascular status: acceptable  Respiratory status: acceptable  Hydration status: acceptable  PONV: none     Anesthetic complications: None          Last vitals:  Vitals:    12/26/17 1445 12/26/17 1450 12/26/17 1500   BP: (!) 140/94 (!) 140/94 130/81   Resp: 16 14 12   Temp: 37.1  C (98.7  F)     SpO2: 97% 97% 96%         Electronically Signed By: Anita Raymond  December 26, 2017  3:27 PM

## 2017-12-26 NOTE — PROGRESS NOTES
Admission medication history interview status for the 12/26/2017  admission is complete. See EPIC admission navigator for prior to admission medications     Medication history source reliability:Good    Medication history interview source(s):Patient    Medication history resources (including written lists, pill bottles, clinic record):None    Primary pharmacy.[facility]    Additional medication history information not noted on PTA med list :None    Time spent in this activity: 40 minutes    Prior to Admission medications    Medication Sig Last Dose Taking? Auth Provider   ALLOPURINOL PO Take 300 mg by mouth daily 12/25/2017 at 2130 Yes Reported, Patient   methylPREDNISolone (MEDROL DOSEPAK) 4 MG tablet Take 8 mg by mouth daily as needed (gout flares) follow package directions More than a month at PRN Yes Reported, Patient   Acetaminophen (TYLENOL PO) Take 1,000 mg by mouth 2 times daily as needed for mild pain or fever 12/24/2017 at PRN Yes Reported, Patient   metoprolol (TOPROL-XL) 200 MG 24 hr tablet Take 1 tablet by mouth daily. 12/25/2017 at 2130 Yes Jose Antonio Koroma MD   amlodipine (NORVASC) 10 MG tablet Take 1 tablet by mouth daily. 12/25/2017 at 2130 Yes Jose Antonio Koroma MD   triamterene-hydrochlorothiazide (DYAZIDE) 37.5-25 MG per capsule Take 1 capsule by mouth every morning. 12/25/2017 at 2130 Yes Jose Antonio Koroma MD

## 2017-12-27 VITALS
RESPIRATION RATE: 16 BRPM | BODY MASS INDEX: 31.97 KG/M2 | SYSTOLIC BLOOD PRESSURE: 123 MMHG | HEIGHT: 72 IN | TEMPERATURE: 98.2 F | OXYGEN SATURATION: 95 % | DIASTOLIC BLOOD PRESSURE: 74 MMHG | WEIGHT: 236 LBS

## 2017-12-27 LAB
COPATH REPORT: NORMAL
GLUCOSE BLDC GLUCOMTR-MCNC: 115 MG/DL (ref 70–99)

## 2017-12-27 PROCEDURE — 25000132 ZZH RX MED GY IP 250 OP 250 PS 637: Performed by: UROLOGY

## 2017-12-27 PROCEDURE — 25000128 H RX IP 250 OP 636: Performed by: UROLOGY

## 2017-12-27 PROCEDURE — 00000146 ZZHCL STATISTIC GLUCOSE BY METER IP

## 2017-12-27 PROCEDURE — 25800025 ZZH RX 258: Performed by: UROLOGY

## 2017-12-27 RX ORDER — OXYCODONE AND ACETAMINOPHEN 5; 325 MG/1; MG/1
1 TABLET ORAL EVERY 6 HOURS PRN
Qty: 30 TABLET | Refills: 0 | Status: SHIPPED | OUTPATIENT
Start: 2017-12-27

## 2017-12-27 RX ORDER — CIPROFLOXACIN 500 MG/1
500 TABLET, FILM COATED ORAL EVERY 12 HOURS SCHEDULED
Status: DISCONTINUED | OUTPATIENT
Start: 2017-12-27 | End: 2017-12-27 | Stop reason: HOSPADM

## 2017-12-27 RX ADMIN — CIPROFLOXACIN 400 MG: 2 INJECTION, SOLUTION INTRAVENOUS at 07:57

## 2017-12-27 RX ADMIN — ALLOPURINOL 300 MG: 300 TABLET ORAL at 08:00

## 2017-12-27 RX ADMIN — TRIAMTERENE AND HYDROCHLOROTHIAZIDE 1 TABLET: 37.5; 25 TABLET ORAL at 08:00

## 2017-12-27 RX ADMIN — DEXTROSE AND SODIUM CHLORIDE: 5; 450 INJECTION, SOLUTION INTRAVENOUS at 02:13

## 2017-12-27 NOTE — PROGRESS NOTES
Patient is A&Ox4. POD 1 Davinci Prostatectomy. No complaints of pain on this shift. VSS.  Capno WDL, DCd. Thompson patent with peach colored output, small clots noted. Four lap sites on abdomen, C/D/I. MAIRA removed, dressing CDI. Tolerating low fiber. Ambulating in halls independently. Discharge instructions reviewed with patient. Thompson teaching completed and supplies sent home with patient. All questions answered. Pt refuses oxycodone script. Wife will be driving patient home.    Per Dr. Brewer, pt will not need Cipro after discharge.

## 2017-12-27 NOTE — PLAN OF CARE
Problem: Patient Care Overview  Goal: Plan of Care/Patient Progress Review  Outcome: No Change  Patient is A&Ox4. POD 1 Davinci Prostatectomy. No complaints of pain on this shift. VSS on 3L O2 via NC.  Capno WDL. LS clear. BS hypoactive,-flatus. Thompson patent with peach colored output (no clots observed). Four lap sites on abdomen, C/D/I. Full liquid diet, tolerating well. MAIRA drain (bright red bloody drainage). IVF-125cc/hr. Pt dangled at bedside in the evening yesterday. Pt ambulated this AM with SBA. POD 1 . Continue to monitor.

## 2017-12-27 NOTE — PROGRESS NOTES
Problem: Patient Care Overview  Goal: Plan of Care/Patient Progress Review  Outcome: Improving    Shift Update: Patient is A&Ox4. POD 0 Davinci Prostatectomy. No complaints of pain on this shift. VSS on 3L O2 via NC. Four lap sites on abdomen, C/D/I. Full liquid diet, tolerating well. MAIRA drain (bright red bloody drainage). Thompson patent with peach colored output (no clots noted). L PIV infusing dextrose 5% and 0.45% NaCl@125mL/hr. Dangled at bedside for several minutes this evening. Plan: Discharge pending.

## 2017-12-27 NOTE — PROGRESS NOTES
Post prostatectomy.   Doing well     P: disch today with stalin. MAIRA to come out before disch      Apt to see me in office Jan 4 th

## 2018-01-18 NOTE — PROCEDURES
[facility] Urology Brief Operative Note    Pre-operative diagnosis: PROSTATE CANCER   Post-operative diagnosis: Same   Procedure: Procedure(s):  DAVINCI XI RADICAL PROSTATECTOMY - Wound Class: II-Clean Contaminated   Surgeon: Bridger Brewer MD   Assistant(s): Dez Hanks   Anesthesia: General endotracheal anesthesia   Estimated blood loss: 50 mL   Total IV fluids: (See anesthesia record)   Blood transfusion: No transfusion was given during surgery   Total urine output: (See anesthesia record)   Drains: Omer-Saleh   Specimens: Prostate and nodes   Implants: None   Findings: Prostate ca   Complications: None   Condition: Stable   Comments: See dictated operative report for full details

## 2018-02-06 NOTE — DISCHARGE SUMMARY
Physician Discharge Summary     Patient ID:  Enoch Cruz  [id number]  63 year old  1955    Admit date: 12/26/2017    Discharge date and time: 12/27/2017  3:39 PM     Admitting Physician: Bridger Brewer MD     Discharge Physician: Dr Bridger Brewer    Admission Diagnoses: PROSTATE CANCER  CA of prostate (H)    Discharge Diagnoses: Prostate Cancer    Admission Condition: good    Discharged Condition: good    Indication for Admission: Surgery    Hospital Course: Pt underwent Robot assisted radical prostatectomy  for prostate cancer. His surgery and post op course were uneventful. He was tolerating diet. His MAIRA is removed.      Consults: none    Significant Diagnostic Studies:     Treatments: surgery: Robot assisted radical prostatectomy    Discharge Exam:    Disposition: home    Patient Instructions:   Discharge Medication List as of 12/27/2017 11:00 AM      START taking these medications    Details   oxyCODONE-acetaminophen (PERCOCET) 5-325 MG per tablet Take 1 tablet by mouth every 6 hours as needed for moderate to severe pain, Disp-30 tablet, R-0, Local Print         CONTINUE these medications which have NOT CHANGED    Details   ALLOPURINOL PO Take 300 mg by mouth daily, Historical      methylPREDNISolone (MEDROL DOSEPAK) 4 MG tablet Take 8 mg by mouth daily as needed (gout flares) follow package directions, Historical      Acetaminophen (TYLENOL PO) Take 1,000 mg by mouth 2 times daily as needed for mild pain or fever, Historical      metoprolol (TOPROL-XL) 200 MG 24 hr tablet Take 1 tablet by mouth daily., Disp-90 tablet, R-3, Fax      amlodipine (NORVASC) 10 MG tablet Take 1 tablet by mouth daily., Disp-90 tablet, R-3, Fax      triamterene-hydrochlorothiazide (DYAZIDE) 37.5-25 MG per capsule Take 1 capsule by mouth every morning., Disp-90 capsule, R-3, Fax           Activity: activity as tolerated  Diet: regular diet  Wound Care: keep wound clean and dry    Follow-up with Dr Brewer in 2 wks for stalin  removal      Signed:  Bridger Brewer MD  2/5/2018  8:12 PM

## 2019-10-02 ENCOUNTER — HEALTH MAINTENANCE LETTER (OUTPATIENT)
Age: 64
End: 2019-10-02

## 2021-01-15 ENCOUNTER — HEALTH MAINTENANCE LETTER (OUTPATIENT)
Age: 66
End: 2021-01-15

## 2021-03-21 ENCOUNTER — HEALTH MAINTENANCE LETTER (OUTPATIENT)
Age: 66
End: 2021-03-21

## 2021-09-04 ENCOUNTER — HEALTH MAINTENANCE LETTER (OUTPATIENT)
Age: 66
End: 2021-09-04

## 2022-04-16 ENCOUNTER — HEALTH MAINTENANCE LETTER (OUTPATIENT)
Age: 67
End: 2022-04-16

## 2022-10-22 ENCOUNTER — HEALTH MAINTENANCE LETTER (OUTPATIENT)
Age: 67
End: 2022-10-22

## 2022-12-10 ENCOUNTER — HEALTH MAINTENANCE LETTER (OUTPATIENT)
Age: 67
End: 2022-12-10

## (undated) DEVICE — GLOVE PROTEXIS W/NEU-THERA 8.0  2D73TE80

## (undated) DEVICE — PACK DAVINCI UROLOGY SBA15UDFSG

## (undated) DEVICE — ENDO POUCH UNIV RETRIEVAL SYSTEM INZII 10MM CD001

## (undated) DEVICE — SOL NACL 0.9% IRRIG 1000ML BOTTLE 07138-09

## (undated) DEVICE — SURGICEL HEMOSTAT 3X4" NUKNIT 1943

## (undated) DEVICE — WIPES FOLEY CARE SURESTEP PROVON DFC100

## (undated) DEVICE — DRAIN CHANNEL ROUND 15FR FLUTED 072188

## (undated) DEVICE — KIT PATIENT POSITIONING PIGAZZI LATEX FREE 40580

## (undated) DEVICE — DAVINCI XI DRAPE COLUMN 470341

## (undated) DEVICE — SOL NACL 0.9% INJ 1000ML BAG 2B1324X

## (undated) DEVICE — LINEN TOWEL PACK X5 5464

## (undated) DEVICE — SU MONOCRYL 3-0 RB-1 27" UND Y215H

## (undated) DEVICE — SUCTION IRR TRUMPET VALVE LAP ASU1201

## (undated) DEVICE — ENDO TROCAR 12MM VERSASTEP VS101012P

## (undated) DEVICE — CLIP ENDO HEMO-LOC PURPLE LG 544240

## (undated) DEVICE — SU MONOCRYL 3-0 RB-1 27" Y305H

## (undated) DEVICE — CATH TRAY FOLEY SURESTEP 16FR WDRAIN BAG STLK LATEX A300316A

## (undated) DEVICE — DAVINCI HOT SHEARS TIP COVER  400180

## (undated) DEVICE — SU ETHILON 2-0 FS 18" 664H

## (undated) DEVICE — ESU VESSEL SEALER ENSEAL ARTICULATING 35CM CVD NSLG2C35A

## (undated) DEVICE — ESU GROUND PAD UNIVERSAL W/O CORD

## (undated) DEVICE — SU VICRYL 0 CT-1 27" J340H

## (undated) DEVICE — DRAIN JACKSON PRATT RESERVOIR 100ML SU130-1305

## (undated) DEVICE — SU VICRYL 0 UR-5 27" J376H

## (undated) DEVICE — DAVINCI XI DRAPE ARM 470015

## (undated) DEVICE — GRASPER LAPAROSCOPIC EPIX 5MMX35CM C4130

## (undated) DEVICE — GLOVE PROTEXIS BLUE W/NEU-THERA 8.5  2D73EB85

## (undated) DEVICE — ENDO POUCH REIACATCH 2.44" 10MM CATCH10

## (undated) DEVICE — TUBING CONMED AIRSEAL SMOKE EVAC INSUFFLATION ASM-EVAC

## (undated) DEVICE — SET EXTENSION MICROBORE 2.0ML 73" EB-072-01

## (undated) DEVICE — SU VICRYL 0 UR-6 27" J603H

## (undated) DEVICE — CATH FOLEY 2WAY 20FR 30ML LUBRICATH LATEX 0166L20

## (undated) DEVICE — DAVINCI XI GRASPER ENDOWRIST BIPOLAR LONG 470400

## (undated) DEVICE — NDL INSUFFLATION 14GA STEP S100000

## (undated) DEVICE — PREP CHLORAPREP 26ML TINTED ORANGE  260815

## (undated) DEVICE — SUCTION CANISTER MEDIVAC LINER 3000ML W/LID 65651-530

## (undated) DEVICE — DAVINCI XI SEAL UNIVERSAL 5-8MM 470361

## (undated) DEVICE — BLADE CLIPPER 4406

## (undated) DEVICE — ENDO SHEARS 5MM 176643

## (undated) RX ORDER — DEXAMETHASONE SODIUM PHOSPHATE 4 MG/ML
INJECTION, SOLUTION INTRA-ARTICULAR; INTRALESIONAL; INTRAMUSCULAR; INTRAVENOUS; SOFT TISSUE
Status: DISPENSED
Start: 2017-12-26

## (undated) RX ORDER — BUPIVACAINE HYDROCHLORIDE 2.5 MG/ML
INJECTION, SOLUTION EPIDURAL; INFILTRATION; INTRACAUDAL
Status: DISPENSED
Start: 2017-12-26

## (undated) RX ORDER — HYDROMORPHONE HYDROCHLORIDE 1 MG/ML
INJECTION, SOLUTION INTRAMUSCULAR; INTRAVENOUS; SUBCUTANEOUS
Status: DISPENSED
Start: 2017-12-26

## (undated) RX ORDER — PROPOFOL 10 MG/ML
INJECTION, EMULSION INTRAVENOUS
Status: DISPENSED
Start: 2017-12-26

## (undated) RX ORDER — ACETAMINOPHEN 10 MG/ML
INJECTION, SOLUTION INTRAVENOUS
Status: DISPENSED
Start: 2017-12-26

## (undated) RX ORDER — GLYCOPYRROLATE 0.2 MG/ML
INJECTION, SOLUTION INTRAMUSCULAR; INTRAVENOUS
Status: DISPENSED
Start: 2017-12-26

## (undated) RX ORDER — CLINDAMYCIN PHOSPHATE 900 MG/50ML
INJECTION, SOLUTION INTRAVENOUS
Status: DISPENSED
Start: 2017-12-26

## (undated) RX ORDER — LIDOCAINE HYDROCHLORIDE 20 MG/ML
INJECTION, SOLUTION EPIDURAL; INFILTRATION; INTRACAUDAL; PERINEURAL
Status: DISPENSED
Start: 2017-12-26

## (undated) RX ORDER — ONDANSETRON 2 MG/ML
INJECTION INTRAMUSCULAR; INTRAVENOUS
Status: DISPENSED
Start: 2017-12-26

## (undated) RX ORDER — VECURONIUM BROMIDE 1 MG/ML
INJECTION, POWDER, LYOPHILIZED, FOR SOLUTION INTRAVENOUS
Status: DISPENSED
Start: 2017-12-26

## (undated) RX ORDER — FENTANYL CITRATE 50 UG/ML
INJECTION, SOLUTION INTRAMUSCULAR; INTRAVENOUS
Status: DISPENSED
Start: 2017-12-26